# Patient Record
Sex: MALE | Race: WHITE | NOT HISPANIC OR LATINO | Employment: OTHER | RURAL
[De-identification: names, ages, dates, MRNs, and addresses within clinical notes are randomized per-mention and may not be internally consistent; named-entity substitution may affect disease eponyms.]

---

## 2024-01-02 ENCOUNTER — TELEPHONE (OUTPATIENT)
Dept: PULMONOLOGY | Facility: CLINIC | Age: 79
End: 2024-01-02
Payer: OTHER GOVERNMENT

## 2024-01-02 ENCOUNTER — OFFICE VISIT (OUTPATIENT)
Dept: PULMONOLOGY | Facility: CLINIC | Age: 79
End: 2024-01-02
Payer: OTHER GOVERNMENT

## 2024-01-02 VITALS
RESPIRATION RATE: 20 BRPM | HEART RATE: 92 BPM | BODY MASS INDEX: 27.39 KG/M2 | HEIGHT: 77 IN | WEIGHT: 232 LBS | SYSTOLIC BLOOD PRESSURE: 120 MMHG | OXYGEN SATURATION: 98 % | DIASTOLIC BLOOD PRESSURE: 80 MMHG

## 2024-01-02 DIAGNOSIS — R06.02 SOB (SHORTNESS OF BREATH): Primary | ICD-10-CM

## 2024-01-02 PROCEDURE — 99203 OFFICE O/P NEW LOW 30 MIN: CPT | Mod: ,,, | Performed by: INTERNAL MEDICINE

## 2024-01-02 RX ORDER — BUPROPION HYDROCHLORIDE 150 MG/1
150 TABLET ORAL DAILY
COMMUNITY

## 2024-01-02 RX ORDER — PNV NO.95/FERROUS FUM/FOLIC AC 28MG-0.8MG
100 TABLET ORAL DAILY
COMMUNITY

## 2024-01-02 RX ORDER — DICLOFENAC SODIUM 30 MG/G
GEL TOPICAL DAILY PRN
COMMUNITY
Start: 2023-10-04

## 2024-01-02 RX ORDER — LISINOPRIL AND HYDROCHLOROTHIAZIDE 10; 12.5 MG/1; MG/1
1 TABLET ORAL DAILY
COMMUNITY

## 2024-01-02 RX ORDER — ALBUTEROL SULFATE 90 UG/1
2 AEROSOL, METERED RESPIRATORY (INHALATION) EVERY 6 HOURS PRN
Qty: 18 G | Refills: 5 | Status: SHIPPED | OUTPATIENT
Start: 2024-01-02

## 2024-01-02 RX ORDER — ACETAMINOPHEN 325 MG/1
325 TABLET ORAL EVERY 4 HOURS PRN
COMMUNITY
Start: 2023-07-17

## 2024-01-02 RX ORDER — OMEPRAZOLE 20 MG/1
20 CAPSULE, DELAYED RELEASE ORAL DAILY
COMMUNITY

## 2024-01-02 RX ORDER — TAMSULOSIN HYDROCHLORIDE 0.4 MG/1
0.4 CAPSULE ORAL DAILY
COMMUNITY

## 2024-01-02 RX ORDER — ROSUVASTATIN CALCIUM 20 MG/1
20 TABLET, COATED ORAL DAILY
COMMUNITY

## 2024-01-02 RX ORDER — OMEPRAZOLE 20 MG/1
20 TABLET, DELAYED RELEASE ORAL DAILY
COMMUNITY

## 2024-01-02 RX ORDER — ALLOPURINOL 300 MG/1
300 TABLET ORAL DAILY
COMMUNITY

## 2024-01-02 RX ORDER — FLUTICASONE PROPIONATE AND SALMETEROL 250; 50 UG/1; UG/1
1 POWDER RESPIRATORY (INHALATION) 2 TIMES DAILY
Qty: 60 EACH | Refills: 5 | Status: SHIPPED | OUTPATIENT
Start: 2024-01-02 | End: 2025-01-01

## 2024-01-02 NOTE — PROGRESS NOTES
Subjective:       Patient ID: Carmella Mccormick is a 78 y.o. male.    Chief Complaint: Shortness of Breath (SOB has had for couple years now. Cough for about 2/3 weeks. Has taken anitibiotics and zyrtec has helped some. No inhalers. SOB gets worse when he does anything physical. Sometimes when he sits around and talks gets short of breathe.)    Shortness of Breath  This is a chronic problem. The current episode started more than 1 month ago. The problem has been unchanged. Pertinent negatives include no abdominal pain, chest pain, ear pain, headaches or rash. The symptoms are aggravated by exercise.     Past Medical History:   Diagnosis Date    Acid reflux     Depression     Gout, unspecified     High blood pressure     High cholesterol     Urination decrease      History reviewed. No pertinent surgical history.  History reviewed. No pertinent family history.  Review of patient's allergies indicates:  Not on File   Social History     Tobacco Use    Smoking status: Never    Smokeless tobacco: Former     Types: Chew, Snuff     Quit date: 01/2022   Substance Use Topics    Alcohol use: Not Currently    Drug use: Not Currently      Review of Systems   Constitutional:  Negative for chills, activity change and night sweats.   HENT:  Negative for congestion and ear pain.    Eyes:  Negative for redness and itching.   Respiratory:  Positive for shortness of breath.    Cardiovascular:  Negative for chest pain and palpitations.   Musculoskeletal:  Negative for arthralgias and back pain.   Skin:  Negative for rash.   Gastrointestinal:  Negative for abdominal pain and abdominal distention.   Neurological:  Negative for dizziness and headaches.   Hematological:  Negative for adenopathy. Does not bruise/bleed easily.   Psychiatric/Behavioral:  Negative for confusion. The patient is not nervous/anxious.        Objective:      Physical Exam   Constitutional: He is oriented to person, place, and time. He appears well-developed and  "well-nourished.   HENT:   Head: Normocephalic.   Nose: Nose normal.   Mouth/Throat: Oropharynx is clear and moist.   Neck: No JVD present. No thyromegaly present.   Cardiovascular: Normal rate, regular rhythm, normal heart sounds and intact distal pulses.   Pulmonary/Chest: Normal expansion, hyperinflation, symmetric chest wall expansion, effort normal and breath sounds normal.   Abdominal: Soft. Bowel sounds are normal.   Musculoskeletal:         General: Normal range of motion.      Cervical back: Normal range of motion and neck supple.   Lymphadenopathy: No supraclavicular adenopathy is present.     He has no cervical adenopathy.   Neurological: He is alert and oriented to person, place, and time. He has normal reflexes.   Skin: Skin is warm and dry.   Psychiatric: He has a normal mood and affect. His behavior is normal.     Personal Diagnostic Review  none pertinent        1/2/2024     2:01 PM   Pulmonary Function Tests   SpO2 98 %   Height 6' 5" (1.956 m)   Weight 105.2 kg (232 lb)   BMI (Calculated) 27.5         Assessment:       1. SOB (shortness of breath)        Outpatient Encounter Medications as of 1/2/2024   Medication Sig Dispense Refill    acetaminophen (TYLENOL) 325 MG tablet Take 325 mg by mouth every 4 (four) hours as needed.      allopurinoL (ZYLOPRIM) 300 MG tablet Take 300 mg by mouth once daily.      blood glucose control high,low Soln USE ACCU-CHEK CONTROL SOLUTION BLOOD GLUCOSE AS DIRECTED TO TEST GLUCOMETER TO CHECK GLUCOMETER      blood sugar diagnostic Strp USE 1 STRIP FOR TESTING BLOOD GLUCOSE 2 TIMES A DAY AS NEEDED      buPROPion (WELLBUTRIN XL) 150 MG TB24 tablet Take 150 mg by mouth once daily.      cyanocobalamin (VITAMIN B-12) 100 MCG tablet Take 100 mcg by mouth once daily.      diclofenac sodium (SOLARAZE) 3 % gel Apply topically daily as needed.      lisinopriL-hydrochlorothiazide (PRINZIDE,ZESTORETIC) 10-12.5 mg per tablet Take 1 tablet by mouth once daily.      omeprazole " (PRILOSEC OTC) 20 MG tablet Take 20 mg by mouth once daily.      omeprazole (PRILOSEC) 20 MG capsule Take 20 mg by mouth once daily.      rosuvastatin (CRESTOR) 20 MG tablet Take 20 mg by mouth once daily.      tamsulosin (FLOMAX) 0.4 mg Cap Take 0.4 mg by mouth once daily.      albuterol (VENTOLIN HFA) 90 mcg/actuation inhaler Inhale 2 puffs into the lungs every 6 (six) hours as needed for Wheezing. Rescue 18 g 5    fluticasone-salmeterol diskus inhaler 250-50 mcg Inhale 1 puff into the lungs 2 (two) times daily. Controller 60 each 5     No facility-administered encounter medications on file as of 1/2/2024.     No orders of the defined types were placed in this encounter.      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

## 2024-01-02 NOTE — TELEPHONE ENCOUNTER
Pt was seen in [place], Dr. Segura order CXR and PFT. Orders were faxed to [facility]. Appt for PFT 1/9/2024 @ 900 am. CXR before/after same day.

## 2024-01-02 NOTE — ASSESSMENT & PLAN NOTE
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

## 2024-02-27 ENCOUNTER — OFFICE VISIT (OUTPATIENT)
Dept: PULMONOLOGY | Facility: CLINIC | Age: 79
End: 2024-02-27
Payer: OTHER GOVERNMENT

## 2024-02-27 VITALS
WEIGHT: 239 LBS | SYSTOLIC BLOOD PRESSURE: 128 MMHG | HEART RATE: 86 BPM | RESPIRATION RATE: 16 BRPM | HEIGHT: 77 IN | DIASTOLIC BLOOD PRESSURE: 88 MMHG | BODY MASS INDEX: 28.22 KG/M2

## 2024-02-27 DIAGNOSIS — R06.02 SOB (SHORTNESS OF BREATH): Primary | ICD-10-CM

## 2024-02-27 PROCEDURE — 99213 OFFICE O/P EST LOW 20 MIN: CPT | Mod: ,,, | Performed by: INTERNAL MEDICINE

## 2024-02-27 NOTE — ASSESSMENT & PLAN NOTE
Patient is here for follow up shortness of breath he had a good response to Advair chest x-ray is more unremarkable and he has no evidence of any abnormality pulmonary function testing this is brought him some inflammatory bronchospasm versus asthma continue use Advair as noted I will see back in 6 months does not have a primary care physician

## 2024-02-27 NOTE — PROGRESS NOTES
Subjective:       Patient ID: Carmella Mccormick is a 79 y.o. male.    Chief Complaint: Shortness of Breath    Shortness of Breath  This is a chronic problem. The current episode started more than 1 month ago. The problem has been unchanged. Pertinent negatives include no abdominal pain, chest pain, ear pain, headaches or rash.     Past Medical History:   Diagnosis Date    Acid reflux     Depression     Gout, unspecified     High blood pressure     High cholesterol     Urination decrease      History reviewed. No pertinent surgical history.  History reviewed. No pertinent family history.  Review of patient's allergies indicates:  No Known Allergies   Social History     Tobacco Use    Smoking status: Never    Smokeless tobacco: Former     Types: Chew, Snuff     Quit date: 01/2022   Substance Use Topics    Alcohol use: Not Currently    Drug use: Not Currently      Review of Systems   Constitutional:  Negative for chills, activity change and night sweats.   HENT:  Negative for congestion and ear pain.    Eyes:  Negative for redness and itching.   Respiratory:  Positive for shortness of breath.    Cardiovascular:  Negative for chest pain and palpitations.   Musculoskeletal:  Negative for arthralgias and back pain.   Skin:  Negative for rash.   Gastrointestinal:  Negative for abdominal pain and abdominal distention.   Neurological:  Negative for dizziness and headaches.   Hematological:  Negative for adenopathy. Does not bruise/bleed easily.   Psychiatric/Behavioral:  Negative for confusion. The patient is not nervous/anxious.        Objective:      Physical Exam   Constitutional: He is oriented to person, place, and time. He appears well-developed and well-nourished.   HENT:   Head: Normocephalic.   Nose: Nose normal.   Mouth/Throat: Oropharynx is clear and moist.   Neck: No JVD present. No thyromegaly present.   Cardiovascular: Normal rate, regular rhythm, normal heart sounds and intact distal pulses.   Pulmonary/Chest:  "Normal expansion, hyperinflation, symmetric chest wall expansion, effort normal and breath sounds normal.   Abdominal: Soft. Bowel sounds are normal.   Musculoskeletal:         General: Normal range of motion.      Cervical back: Normal range of motion and neck supple.   Lymphadenopathy: No supraclavicular adenopathy is present.     He has no cervical adenopathy.   Neurological: He is alert and oriented to person, place, and time. He has normal reflexes.   Skin: Skin is warm and dry.   Psychiatric: He has a normal mood and affect. His behavior is normal.     Personal Diagnostic Review  none pertinent        2/27/2024     1:57 PM 1/2/2024     2:01 PM   Pulmonary Function Tests   SpO2  98 %   Height 6' 5" (1.956 m) 6' 5" (1.956 m)   Weight 108.4 kg (239 lb) 105.2 kg (232 lb)   BMI (Calculated) 28.3 27.5         Assessment:       1. SOB (shortness of breath)        Outpatient Encounter Medications as of 2/27/2024   Medication Sig Dispense Refill    acetaminophen (TYLENOL) 325 MG tablet Take 325 mg by mouth every 4 (four) hours as needed.      albuterol (VENTOLIN HFA) 90 mcg/actuation inhaler Inhale 2 puffs into the lungs every 6 (six) hours as needed for Wheezing. Rescue 18 g 5    allopurinoL (ZYLOPRIM) 300 MG tablet Take 300 mg by mouth once daily.      blood glucose control high,low Soln USE ACCU-CHEK CONTROL SOLUTION BLOOD GLUCOSE AS DIRECTED TO TEST GLUCOMETER TO CHECK GLUCOMETER      blood sugar diagnostic Strp USE 1 STRIP FOR TESTING BLOOD GLUCOSE 2 TIMES A DAY AS NEEDED      buPROPion (WELLBUTRIN XL) 150 MG TB24 tablet Take 150 mg by mouth once daily.      cyanocobalamin (VITAMIN B-12) 100 MCG tablet Take 100 mcg by mouth once daily.      diclofenac sodium (SOLARAZE) 3 % gel Apply topically daily as needed.      fluticasone-salmeterol diskus inhaler 250-50 mcg Inhale 1 puff into the lungs 2 (two) times daily. Controller 60 each 5    lisinopriL-hydrochlorothiazide (PRINZIDE,ZESTORETIC) 10-12.5 mg per tablet Take " 1 tablet by mouth once daily.      omeprazole (PRILOSEC OTC) 20 MG tablet Take 20 mg by mouth once daily.      rosuvastatin (CRESTOR) 20 MG tablet Take 20 mg by mouth once daily.      tamsulosin (FLOMAX) 0.4 mg Cap Take 0.4 mg by mouth once daily.      omeprazole (PRILOSEC) 20 MG capsule Take 20 mg by mouth once daily.       No facility-administered encounter medications on file as of 2/27/2024.     No orders of the defined types were placed in this encounter.      Plan:       Problem List Items Addressed This Visit          Pulmonary    SOB (shortness of breath) - Primary     Patient is here for follow up shortness of breath he had a good response to Advair chest x-ray is more unremarkable and he has no evidence of any abnormality pulmonary function testing this is brought him some inflammatory bronchospasm versus asthma continue use Advair as noted I will see back in 6 months does not have a primary care physician

## 2024-08-13 ENCOUNTER — OFFICE VISIT (OUTPATIENT)
Dept: PULMONOLOGY | Facility: CLINIC | Age: 79
End: 2024-08-13
Payer: OTHER GOVERNMENT

## 2024-08-13 VITALS
OXYGEN SATURATION: 95 % | HEART RATE: 77 BPM | RESPIRATION RATE: 16 BRPM | WEIGHT: 239.19 LBS | BODY MASS INDEX: 28.24 KG/M2 | SYSTOLIC BLOOD PRESSURE: 118 MMHG | DIASTOLIC BLOOD PRESSURE: 84 MMHG | HEIGHT: 77 IN

## 2024-08-13 DIAGNOSIS — R06.02 SOB (SHORTNESS OF BREATH): Primary | ICD-10-CM

## 2024-08-13 PROCEDURE — 99213 OFFICE O/P EST LOW 20 MIN: CPT | Mod: ,,, | Performed by: INTERNAL MEDICINE

## 2024-08-13 NOTE — ASSESSMENT & PLAN NOTE
Patient without complaints today using Advair and Ventolin able carry out his activities of daily living [facility] so will see back in 6 months no change in his medicines

## 2024-08-13 NOTE — PROGRESS NOTES
Subjective:       Patient ID: Carmella Mccormick is a 79 y.o. male.    Chief Complaint: Shortness of Breath (With exertion)    Shortness of Breath  This is a chronic problem. The current episode started more than 1 month ago. Pertinent negatives include no abdominal pain, chest pain, ear pain, headaches or rash.     Past Medical History:   Diagnosis Date    Acid reflux     Depression     Gout, unspecified     High blood pressure     High cholesterol     Urination decrease      History reviewed. No pertinent surgical history.  No family history on file.  Review of patient's allergies indicates:  No Known Allergies   Social History     Tobacco Use    Smoking status: Never    Smokeless tobacco: Former     Types: Chew, Snuff     Quit date: 01/2022   Substance Use Topics    Alcohol use: Not Currently    Drug use: Not Currently      Review of Systems   Constitutional:  Negative for chills, activity change and night sweats.   HENT:  Negative for congestion and ear pain.    Eyes:  Negative for redness and itching.   Respiratory:  Positive for shortness of breath.    Cardiovascular:  Negative for chest pain and palpitations.   Musculoskeletal:  Negative for arthralgias and back pain.   Skin:  Negative for rash.   Gastrointestinal:  Negative for abdominal pain and abdominal distention.   Neurological:  Negative for dizziness and headaches.   Hematological:  Negative for adenopathy. Does not bruise/bleed easily.   Psychiatric/Behavioral:  Negative for confusion. The patient is not nervous/anxious.        Objective:      Physical Exam   Constitutional: He is oriented to person, place, and time. He appears well-developed and well-nourished.   HENT:   Head: Normocephalic.   Nose: Nose normal.   Mouth/Throat: Oropharynx is clear and moist.   Neck: No JVD present. No thyromegaly present.   Cardiovascular: Normal rate, regular rhythm, normal heart sounds and intact distal pulses.   Pulmonary/Chest: Normal expansion, hyperinflation,  "symmetric chest wall expansion, effort normal and breath sounds normal.   Abdominal: Soft. Bowel sounds are normal.   Musculoskeletal:         General: Normal range of motion.      Cervical back: Normal range of motion and neck supple.   Lymphadenopathy: No supraclavicular adenopathy is present.     He has no cervical adenopathy.   Neurological: He is alert and oriented to person, place, and time. He has normal reflexes.   Skin: Skin is warm and dry.   Psychiatric: He has a normal mood and affect. His behavior is normal.     Personal Diagnostic Review  none pertinent        8/13/2024     2:21 PM 2/27/2024     1:57 PM 1/2/2024     2:01 PM   Pulmonary Function Tests   SpO2 95 %  98 %   Height 6' 5" (1.956 m) 6' 5" (1.956 m) 6' 5" (1.956 m)   Weight 108.5 kg (239 lb 3.2 oz) 108.4 kg (239 lb) 105.2 kg (232 lb)   BMI (Calculated) 28.4 28.3 27.5         Assessment:       1. SOB (shortness of breath)        Outpatient Encounter Medications as of 8/13/2024   Medication Sig Dispense Refill    acetaminophen (TYLENOL) 325 MG tablet Take 325 mg by mouth every 4 (four) hours as needed.      albuterol (VENTOLIN HFA) 90 mcg/actuation inhaler Inhale 2 puffs into the lungs every 6 (six) hours as needed for Wheezing. Rescue 18 g 5    allopurinoL (ZYLOPRIM) 300 MG tablet Take 300 mg by mouth once daily.      blood glucose control high,low Soln USE ACCU-CHEK CONTROL SOLUTION BLOOD GLUCOSE AS DIRECTED TO TEST GLUCOMETER TO CHECK GLUCOMETER      blood sugar diagnostic Strp USE 1 STRIP FOR TESTING BLOOD GLUCOSE 2 TIMES A DAY AS NEEDED      buPROPion (WELLBUTRIN XL) 150 MG TB24 tablet Take 150 mg by mouth once daily.      cyanocobalamin (VITAMIN B-12) 100 MCG tablet Take 100 mcg by mouth once daily.      diclofenac sodium (SOLARAZE) 3 % gel Apply topically daily as needed.      fluticasone-salmeterol diskus inhaler 250-50 mcg Inhale 1 puff into the lungs 2 (two) times daily. Controller 60 each 5    lisinopriL-hydrochlorothiazide " (PRINZIDE,ZESTORETIC) 10-12.5 mg per tablet Take 1 tablet by mouth once daily.      omeprazole (PRILOSEC OTC) 20 MG tablet Take 20 mg by mouth once daily.      tamsulosin (FLOMAX) 0.4 mg Cap Take 0.4 mg by mouth once daily.      omeprazole (PRILOSEC) 20 MG capsule Take 20 mg by mouth once daily.      rosuvastatin (CRESTOR) 20 MG tablet Take 20 mg by mouth once daily. (Patient not taking: Reported on 8/13/2024)       No facility-administered encounter medications on file as of 8/13/2024.     No orders of the defined types were placed in this encounter.      Plan:       Problem List Items Addressed This Visit          Pulmonary    SOB (shortness of breath) - Primary     Patient without complaints today using Advair and Ventolin able carry out his activities of daily living [facility] so will see back in 6 months no change in his medicines

## 2025-03-18 ENCOUNTER — OFFICE VISIT (OUTPATIENT)
Dept: PULMONOLOGY | Facility: CLINIC | Age: 80
End: 2025-03-18
Payer: OTHER GOVERNMENT

## 2025-03-18 VITALS
HEART RATE: 78 BPM | OXYGEN SATURATION: 95 % | HEIGHT: 77 IN | WEIGHT: 239 LBS | DIASTOLIC BLOOD PRESSURE: 80 MMHG | SYSTOLIC BLOOD PRESSURE: 120 MMHG | RESPIRATION RATE: 18 BRPM | BODY MASS INDEX: 28.22 KG/M2

## 2025-03-18 DIAGNOSIS — R06.02 SOB (SHORTNESS OF BREATH): Primary | ICD-10-CM

## 2025-03-18 PROCEDURE — 99213 OFFICE O/P EST LOW 20 MIN: CPT | Mod: ,,, | Performed by: INTERNAL MEDICINE

## 2025-03-18 RX ORDER — ERGOCALCIFEROL 1.25 MG/1
1250 CAPSULE ORAL
COMMUNITY
Start: 2025-02-19

## 2025-03-18 NOTE — ASSESSMENT & PLAN NOTE
Patient is doing well today he is stable he only gets short of breath exertion which is extreme when soon exam used his chainsaw currently he is doing well chest x-ray and PFTs were unremarkable he uses p.r.n. Ventolin I will see him back as needed

## 2025-03-18 NOTE — PROGRESS NOTES
Subjective:       Patient ID: Carmella Mccormick is a 80 y.o. male.    Chief Complaint: Shortness of Breath (Same as last time no better no worse/)    Shortness of Breath  This is a chronic problem. The current episode started more than 1 month ago. The problem has been unchanged. Pertinent negatives include no abdominal pain, chest pain, ear pain, headaches or rash. The symptoms are aggravated by exercise.     Past Medical History:   Diagnosis Date    Acid reflux     Depression     Gout, unspecified     High blood pressure     High cholesterol     Urination decrease      History reviewed. No pertinent surgical history.  No family history on file.  Review of patient's allergies indicates:  No Known Allergies   Social History[1]   Review of Systems   Constitutional:  Negative for chills, activity change and night sweats.   HENT:  Negative for congestion and ear pain.    Eyes:  Negative for redness and itching.   Respiratory:  Positive for shortness of breath.    Cardiovascular:  Negative for chest pain and palpitations.   Musculoskeletal:  Negative for arthralgias and back pain.   Skin:  Negative for rash.   Gastrointestinal:  Negative for abdominal pain and abdominal distention.   Neurological:  Negative for dizziness and headaches.   Hematological:  Negative for adenopathy. Does not bruise/bleed easily.   Psychiatric/Behavioral:  Negative for confusion. The patient is not nervous/anxious.        Objective:      Physical Exam   Constitutional: He is oriented to person, place, and time. He appears well-developed and well-nourished.   HENT:   Head: Normocephalic.   Nose: Nose normal.   Mouth/Throat: Oropharynx is clear and moist.   Neck: No JVD present. No thyromegaly present.   Cardiovascular: Normal rate, regular rhythm, normal heart sounds and intact distal pulses.   Pulmonary/Chest: Normal expansion, hyperinflation, symmetric chest wall expansion, effort normal and breath sounds normal.   Abdominal: Soft. Bowel  "sounds are normal.   Musculoskeletal:         General: Normal range of motion.      Cervical back: Normal range of motion and neck supple.   Lymphadenopathy: No supraclavicular adenopathy is present.     He has no cervical adenopathy.   Neurological: He is alert and oriented to person, place, and time. He has normal reflexes.   Skin: Skin is warm and dry.   Psychiatric: He has a normal mood and affect. His behavior is normal.     Personal Diagnostic Review  none pertinent        3/18/2025     1:21 PM 8/13/2024     2:21 PM 2/27/2024     1:57 PM 1/2/2024     2:01 PM   Pulmonary Function Tests   SpO2 95 % 95 %  98 %   Height 6' 5" (1.956 m) 6' 5" (1.956 m) 6' 5" (1.956 m) 6' 5" (1.956 m)   Weight 108.4 kg (239 lb) 108.5 kg (239 lb 3.2 oz) 108.4 kg (239 lb) 105.2 kg (232 lb)   BMI (Calculated) 28.3 28.4 28.3 27.5         Assessment:       1. SOB (shortness of breath)        Encounter Medications[2]  No orders of the defined types were placed in this encounter.      Plan:       Problem List Items Addressed This Visit          Pulmonary    SOB (shortness of breath) - Primary    Patient is doing well today he is stable he only gets short of breath exertion which is extreme when soon exam used his chainsaw currently he is doing well chest x-ray and PFTs were unremarkable he uses p.r.n. Ventolin I will see him back as needed                           [1]   Social History  Tobacco Use    Smoking status: Never    Smokeless tobacco: Former     Types: Chew, Snuff     Quit date: 01/2022   Substance Use Topics    Alcohol use: Not Currently    Drug use: Not Currently   [2]   Outpatient Encounter Medications as of 3/18/2025   Medication Sig Dispense Refill    acetaminophen (TYLENOL) 325 MG tablet Take 325 mg by mouth every 4 (four) hours as needed.      albuterol (VENTOLIN HFA) 90 mcg/actuation inhaler Inhale 2 puffs into the lungs every 6 (six) hours as needed for Wheezing. Rescue 18 g 5    allopurinoL (ZYLOPRIM) 300 MG tablet Take " 300 mg by mouth once daily.      blood glucose control high,low Soln USE ACCU-CHEK CONTROL SOLUTION BLOOD GLUCOSE AS DIRECTED TO TEST GLUCOMETER TO CHECK GLUCOMETER      blood sugar diagnostic Strp USE 1 STRIP FOR TESTING BLOOD GLUCOSE 2 TIMES A DAY AS NEEDED      buPROPion (WELLBUTRIN XL) 150 MG TB24 tablet Take 150 mg by mouth once daily.      diclofenac sodium (SOLARAZE) 3 % gel Apply topically daily as needed.      ergocalciferol (ERGOCALCIFEROL) 50,000 unit Cap Take 1,250 mcg by mouth.      fluticasone-salmeterol diskus inhaler 250-50 mcg Inhale 1 puff into the lungs 2 (two) times daily. Controller 60 each 5    lisinopriL-hydrochlorothiazide (PRINZIDE,ZESTORETIC) 10-12.5 mg per tablet Take 1 tablet by mouth once daily.      omeprazole (PRILOSEC OTC) 20 MG tablet Take 20 mg by mouth once daily.      tamsulosin (FLOMAX) 0.4 mg Cap Take 0.4 mg by mouth once daily.      cyanocobalamin (VITAMIN B-12) 100 MCG tablet Take 100 mcg by mouth once daily. (Patient not taking: Reported on 3/18/2025)      omeprazole (PRILOSEC) 20 MG capsule Take 20 mg by mouth once daily. (Patient not taking: Reported on 3/18/2025)      rosuvastatin (CRESTOR) 20 MG tablet Take 20 mg by mouth once daily. (Patient not taking: Reported on 3/18/2025)       No facility-administered encounter medications on file as of 3/18/2025.